# Patient Record
(demographics unavailable — no encounter records)

---

## 2024-11-12 NOTE — PHYSICAL EXAM
[Well Developed] : well developed [Well Nourished] : well nourished [No Acute Distress] : no acute distress [Normal Venous Pressure] : normal venous pressure [No Carotid Bruit] : no carotid bruit [Normal S1, S2] : normal S1, S2 [No Murmur] : no murmur [No Rub] : no rub [No Gallop] : no gallop [Clear Lung Fields] : clear lung fields [Good Air Entry] : good air entry [No Respiratory Distress] : no respiratory distress  [Soft] : abdomen soft [Non Tender] : non-tender [No Masses/organomegaly] : no masses/organomegaly [Normal Bowel Sounds] : normal bowel sounds [Normal Gait] : normal gait [No Edema] : no edema [No Cyanosis] : no cyanosis [No Clubbing] : no clubbing [No Varicosities] : no varicosities [No Rash] : no rash [No Skin Lesions] : no skin lesions [Moves all extremities] : moves all extremities [No Focal Deficits] : no focal deficits [Normal Speech] : normal speech [Alert and Oriented] : alert and oriented [Normal memory] : normal memory [General Appearance - Well Developed] : well developed [Normal Appearance] : normal appearance [Well Groomed] : well groomed [General Appearance - Well Nourished] : well nourished [No Deformities] : no deformities [General Appearance - In No Acute Distress] : no acute distress [Normal Conjunctiva] : the conjunctiva exhibited no abnormalities [Eyelids - No Xanthelasma] : the eyelids demonstrated no xanthelasmas [Normal Oral Mucosa] : normal oral mucosa [No Oral Pallor] : no oral pallor [No Oral Cyanosis] : no oral cyanosis [Normal Jugular Venous A Waves Present] : normal jugular venous A waves present [Normal Jugular Venous V Waves Present] : normal jugular venous V waves present [No Jugular Venous Rivas A Waves] : no jugular venous rivas A waves [Respiration, Rhythm And Depth] : normal respiratory rhythm and effort [Exaggerated Use Of Accessory Muscles For Inspiration] : no accessory muscle use [Auscultation Breath Sounds / Voice Sounds] : lungs were clear to auscultation bilaterally [Heart Rate And Rhythm] : heart rate and rhythm were normal [Heart Sounds] : normal S1 and S2 [Murmurs] : no murmurs present [Arterial Pulses Normal] : the arterial pulses were normal [Edema] : no peripheral edema present [Abdomen Soft] : soft [Abdomen Tenderness] : non-tender [Abdomen Mass (___ Cm)] : no abdominal mass palpated [Abnormal Walk] : normal gait [Gait - Sufficient For Exercise Testing] : the gait was sufficient for exercise testing [Nail Clubbing] : no clubbing of the fingernails [Cyanosis, Localized] : no localized cyanosis [Petechial Hemorrhages (___cm)] : no petechial hemorrhages [Skin Color & Pigmentation] : normal skin color and pigmentation [] : no rash [No Venous Stasis] : no venous stasis [Skin Lesions] : no skin lesions [No Skin Ulcers] : no skin ulcer [No Xanthoma] : no  xanthoma was observed [Oriented To Time, Place, And Person] : oriented to person, place, and time [Affect] : the affect was normal [Mood] : the mood was normal [No Anxiety] : not feeling anxious

## 2024-11-19 NOTE — CARDIOLOGY SUMMARY
[___] : [unfilled] [Normal] : normal [de-identified] : 11/12/24 SR 10/3/23 SR @ 73 bpm  2/7/23 NSR @ 73 bpm  6/15/21 SR @ 86 bpm 8/9/22 SR @ 73bpm  [de-identified] : 9/21/22 Normal LV size and function, mild symmetric LVH, normal atria, no significant valvular disease

## 2024-11-19 NOTE — DISCUSSION/SUMMARY
[FreeTextEntry1] : Mr. Burns is a 71 y/o. M with PMH significant for deviated septum, CVA without deficits, HTN and HLD who presents for a follow-up evaluation. He continues to feel well. He monitors his heart rhythm regularly with [employer] and has noted no arrhythmias. We discussed the utility of long-term monitoring (ILR, Apple watch), however he refuses at this time. He will follow up with the EP clinic in the future if a heart rhythm issue arises. A referral for general cardiology was provided.

## 2024-11-19 NOTE — REASON FOR VISIT
[Follow-Up - Clinic] : a clinic follow-up of [Dizziness] : dizziness [Hypertension] : hypertension [FreeTextEntry1] : Mr. Burns is a 69 y/o. M with PMH significant for deviated septum, CVA without deficits, HTN and HLD who presents for a follow-up evaluation.   This patient went to see a neurologist after experiencing some dizziness. 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 He was told to see a cardiologist. He initially presented for a cardiology visit. Pt has a PCP who monitors BP and cholesterol levels and prescribes these medications.  He was recommended to have an ILR for long term heart rhythm assessment but declined.  He also did not utilize LTM as recommended in 2021 but has been utilizing [employer].  He reports he is checking his rhythm daily and has had no AF readings.  No rapid/irregular heart action.  Patient reports normal exertional capacity and is active.   LTM given to patient for 14 days to assess for Afib.  (11/1/18-11/24/18) 48-. Brief 4 beat PAT,  No AT/AF.  Echo (10/29/18): Mild concentric LVH, EF 65%, LA size normal. No recent ech

## 2024-11-19 NOTE — DISCUSSION/SUMMARY
[FreeTextEntry1] : Mr. Burns is a 71 y/o. M with PMH significant for deviated septum, CVA without deficits, HTN and HLD who presents for a follow-up evaluation. He continues to feel well. He monitors his heart rhythm regularly with [employer] and has noted no arrhythmias. We discussed the utility of long-term monitoring (ILR, Apple watch), however he refuses at this time. He will follow up with the EP clinic in the future if a heart rhythm issue arises. A referral for general cardiology was provided.

## 2024-11-19 NOTE — REASON FOR VISIT
[Follow-Up - Clinic] : a clinic follow-up of [Dizziness] : dizziness [Hypertension] : hypertension [FreeTextEntry1] : Mr. Burns is a 69 y/o. M with PMH significant for deviated septum, CVA without deficits, HTN and HLD who presents for a follow-up evaluation.   This patient went to see a neurologist after experiencing some dizziness. 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 He was told to see a cardiologist. He initially presented for a cardiology visit. Pt has a PCP who monitors BP and cholesterol levels and prescribes these medications.  He was recommended to have an ILR for long term heart rhythm assessment but declined.  He also did not utilize LTM as recommended in 2021 but has been utilizing [employer].  He reports he is checking his rhythm daily and has had no AF readings.  No rapid/irregular heart action.  Patient reports normal exertional capacity and is active.   LTM given to patient for 14 days to assess for Afib.  (11/1/18-11/24/18) 48-. Brief 4 beat PAT,  No AT/AF.  Echo (10/29/18): Mild concentric LVH, EF 65%, LA size normal. No recent ech

## 2024-11-19 NOTE — END OF VISIT
[] : Fellow [FreeTextEntry3] :  I, Reese Cameron, personally performed the services described in the documentation, reviewed the documentation recorded by the scribe in my presence and it accurately and completely records my words and actions.

## 2024-11-19 NOTE — CARDIOLOGY SUMMARY
[___] : [unfilled] [Normal] : normal [de-identified] : 11/12/24 SR 10/3/23 SR @ 73 bpm  2/7/23 NSR @ 73 bpm  6/15/21 SR @ 86 bpm 8/9/22 SR @ 73bpm  [de-identified] : 9/21/22 Normal LV size and function, mild symmetric LVH, normal atria, no significant valvular disease

## 2024-12-06 NOTE — ASSESSMENT
[FreeTextEntry1] : Diarrhea: The patient complains of diarrhea.  I recommend a low residue diet. The patient is to avoid fiber supplementation. The patient is to consider starting a trial of a probiotic such as Align once a day.  If the symptoms persist, the patient may require sending stool studies for C+S, O+P x3, and C. difficile to assess for an infectious etiology of the diarrhea.  The symptoms are worse after meals.  The patient has a history of cholecystectomy.  I recommend a trial of cholestyramine one packet once a day for possible bile induced diarrhea. If the symptoms persist, the patient may require a colonoscopy to assess for colitis versus other causes.  The patient was told of the risks and benefits of the procedure.  The patient was told of the risks of perforation, emergency surgery, bleeding, infections and missed lesions.  The patient agreed and will follow-up to reassess the symptoms.   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  I recommend a repeat Amylase level.  If the amylase remains elevated, he may require an imaging study to assess the pancreas.  The patient was advised to avoid alcohol.   Diverticulosis: I recommend a high-fiber diet and avoid seeds. The patient is to consider a trial of Metamucil once a day for fiber supplementation. The patient is to also consider a trial of a probiotic such as Align once a day. Internal Hemorrhoids: The patient is to consider a trial of Anusol H. C. suppositories one per rectum nightly and Anusol HC2.5% cream apply to affected area twice a day p.r.n. hemorrhoidal bleeding or pain. History of Colonic Polyps: The patient has a history of colonic polyps. I recommend a repeat colonoscopy in 2 years to reassess for colonic polyps unless symptomatic. The patient agreed and will follow up for the procedure. Fatty Liver: The patient had an imaging study suggestive of fatty infiltration of the liver. The patient denies any jaundice or pruritus. The patient denies any alcohol use. The patient denies taking large doses of nonsteroidal anti-inflammatory drugs or acetaminophen. The findings are suggestive of fatty liver. The patient and I had a long discussion regarding the risks of fatty liver and possible progression to cirrhosis. The patient was told of the possible increased risk of developing liver failure, cirrhosis, ascites, GI bleeding secondary to varices, hepatic encephalopathy, bleeding tendencies and liver cancer. The patient was told of the importance of follow-up. The patient was advised to follow up every 6 months for blood work and imaging studies. The patient agreed and will follow up. The patient was advised to lose weight. I recommend a trial of vitamin E supplementation for the fatty liver. If the liver enzymes remain elevated, the patient may require a trial of Pioglitazone for the fatty liver. I recommend avoid alcohol and hepato-toxic agents. The patient was also advised to avoid NSAIDs, Acetaminophen and any other hepatotoxic drugs. The patient was also advised not to share needles, razors, scissors, nail clippers, etc.. The patient is to continue close follow-up in our office for blood work and exams. If the liver enzymes remain elevated, the patient may require a CT guided liver biopsy to assess the liver parenchyma for possible treatment. We had a long discussion regarding the risks and benefits of the procedure. The patient was told of the risks of bleeding, perforation, infections, emergency surgery and missing lesions. The patient agreed and will follow-up to reassess the symptoms. If the symptoms persist and the amylase remains elevated the patient may require a CAT scan of the abdomen and pelvis to assess the abnormalities. The patient was told of the risks and benefits of the test. The patient agreed and will follow-up to reassess the symptoms. Imaging Study: I recommend an imaging study to assess the symptoms. I recommend an abdominal ultrasound to assess the liver parenchyma and for liver lesions and pancreas. Blood Work: I recommend blood work to assess the patient's symptoms. I recommend a CBC, SMA 24, amylase, lipase, ESR, TFTs, iron, TIBC, ferritin level and lipid profile.  I also recommend obtaining the recent blood work performed by the patient's PMD. Follow-up: The patient is to follow-up in the office in 3 months to reassess the symptoms. The patient was told to call the office if any further problems.

## 2024-12-06 NOTE — HISTORY OF PRESENT ILLNESS
[FreeTextEntry1] : The colonoscopy to the cecum performed at the [facility] [place] GI endoscopy suite on August 17, 2021 revealed scattered left sided diverticulosis, a very long and tortuous colon and small internal hemorrhoids. There were no polyps, masses, AVMs or colitis noted. \par  The colonoscopy performed by another gastroenterologist, Dr. Carl Oro on May 25, 2017 revealed 5 ascending colon polyps and 1 transverse colon polyp.\par  \par   [de-identified] : The abdominal ultrasound performed on March 18, 2024 revealed hepatic steatosis.    The abdominal ultrasound performed on March 1, 2023 revealed hepatic steatosis with no focal hepatic lesions and status postcholecystectomy with normal caliber common bile duct of 5 mm.    The abdominal ultrasound performed on March 11, 2022 revealed hepatic steatosis with no focal hepatic lesions identified.

## 2024-12-06 NOTE — HISTORY OF PRESENT ILLNESS
[FreeTextEntry1] : The colonoscopy to the cecum performed at the [facility] [place] GI endoscopy suite on August 17, 2021 revealed scattered left sided diverticulosis, a very long and tortuous colon and small internal hemorrhoids. There were no polyps, masses, AVMs or colitis noted. \par  The colonoscopy performed by another gastroenterologist, Dr. Carl Oro on May 25, 2017 revealed 5 ascending colon polyps and 1 transverse colon polyp.\par  \par   [de-identified] : The abdominal ultrasound performed on March 18, 2024 revealed hepatic steatosis.    The abdominal ultrasound performed on March 1, 2023 revealed hepatic steatosis with no focal hepatic lesions and status postcholecystectomy with normal caliber common bile duct of 5 mm.    The abdominal ultrasound performed on March 11, 2022 revealed hepatic steatosis with no focal hepatic lesions identified.

## 2024-12-06 NOTE — ASSESSMENT
[FreeTextEntry1] : Diarrhea: The patient complains of diarrhea.  I recommend a low residue diet. The patient is to avoid fiber supplementation. The patient is to consider starting a trial of a probiotic such as Align once a day.  If the symptoms persist, the patient may require sending stool studies for C+S, O+P x3, and C. difficile to assess for an infectious etiology of the diarrhea.  The symptoms are worse after meals.  The patient has a history of cholecystectomy.  I recommend a trial of cholestyramine one packet once a day for possible bile induced diarrhea. If the symptoms persist, the patient may require a colonoscopy to assess for colitis versus other causes.  The patient was told of the risks and benefits of the procedure.  The patient was told of the risks of perforation, emergency surgery, bleeding, infections and missed lesions.  The patient agreed and will follow-up to reassess the symptoms.   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  I recommend a repeat Amylase level.  If the amylase remains elevated, he may require an imaging study to assess the pancreas.  The patient was advised to avoid alcohol.   Diverticulosis: I recommend a high-fiber diet and avoid seeds. The patient is to consider a trial of Metamucil once a day for fiber supplementation. The patient is to also consider a trial of a probiotic such as Align once a day. Internal Hemorrhoids: The patient is to consider a trial of Anusol H. C. suppositories one per rectum nightly and Anusol HC2.5% cream apply to affected area twice a day p.r.n. hemorrhoidal bleeding or pain. History of Colonic Polyps: The patient has a history of colonic polyps. I recommend a repeat colonoscopy in 2 years to reassess for colonic polyps unless symptomatic. The patient agreed and will follow up for the procedure. Fatty Liver: The patient had an imaging study suggestive of fatty infiltration of the liver. The patient denies any jaundice or pruritus. The patient denies any alcohol use. The patient denies taking large doses of nonsteroidal anti-inflammatory drugs or acetaminophen. The findings are suggestive of fatty liver. The patient and I had a long discussion regarding the risks of fatty liver and possible progression to cirrhosis. The patient was told of the possible increased risk of developing liver failure, cirrhosis, ascites, GI bleeding secondary to varices, hepatic encephalopathy, bleeding tendencies and liver cancer. The patient was told of the importance of follow-up. The patient was advised to follow up every 6 months for blood work and imaging studies. The patient agreed and will follow up. The patient was advised to lose weight. I recommend a trial of vitamin E supplementation for the fatty liver. If the liver enzymes remain elevated, the patient may require a trial of Pioglitazone for the fatty liver. I recommend avoid alcohol and hepato-toxic agents. The patient was also advised to avoid NSAIDs, Acetaminophen and any other hepatotoxic drugs. The patient was also advised not to share needles, razors, scissors, nail clippers, etc.. The patient is to continue close follow-up in our office for blood work and exams. If the liver enzymes remain elevated, the patient may require a CT guided liver biopsy to assess the liver parenchyma for possible treatment. We had a long discussion regarding the risks and benefits of the procedure. The patient was told of the risks of bleeding, perforation, infections, emergency surgery and missing lesions. The patient agreed and will follow-up to reassess the symptoms. If the symptoms persist and the amylase remains elevated the patient may require a CAT scan of the abdomen and pelvis to assess the abnormalities. The patient was told of the risks and benefits of the test. The patient agreed and will follow-up to reassess the symptoms. Imaging Study: I recommend an imaging study to assess the symptoms. I recommend an abdominal ultrasound to assess the liver parenchyma and for liver lesions and pancreas. Blood Work: I recommend blood work to assess the patient's symptoms. I recommend a CBC, SMA 24, amylase, lipase, ESR, TFTs, iron, TIBC, ferritin level and lipid profile.  I also recommend obtaining the recent blood work performed by the patient's PMD. Follow-up: The patient is to follow-up in the office in 3 months to reassess the symptoms. The patient was told to call the office if any further problems.  no

## 2025-03-07 NOTE — HISTORY OF PRESENT ILLNESS
[FreeTextEntry1] : The colonoscopy to the cecum performed at the [facility] [place] GI endoscopy suite on August 17, 2021 revealed scattered left sided diverticulosis, a very long and tortuous colon and small internal hemorrhoids. There were no polyps, masses, AVMs or colitis noted. \par  The colonoscopy performed by another gastroenterologist, Dr. Carl Oro on May 25, 2017 revealed 5 ascending colon polyps and 1 transverse colon polyp.\par  \par   [de-identified] : The abdominal ultrasound performed on December 9, 2024 revealed stable moderate fatty liver. Also noted was status postcholecystectomy.    The abdominal ultrasound performed on March 18, 2024 revealed hepatic steatosis.    The abdominal ultrasound performed on March 1, 2023 revealed hepatic steatosis with no focal hepatic lesions and status postcholecystectomy with normal caliber common bile duct of 5 mm.    The abdominal ultrasound performed on March 11, 2022 revealed hepatic steatosis with no focal hepatic lesions identified.

## 2025-03-07 NOTE — ASSESSMENT
[FreeTextEntry1] : Dyspepsia: The patient complains of dyspeptic symptoms.  The patient was advised to continue to abide by an anti-gas (low FOD-MAP) diet.  The patient was previously given a pamphlet for anti-gas (low FOD-MAP).  The patient and I reviewed the anti-gas (low FOD-MAP)diet at length again. The patient is to continue on a trial of Simethicone one tablet 4 times a day p.r.n. abdominal pain and gas. GERD: The patient was advised to avoid late-night meals and dietary indiscretions.  The patient was advised to avoid fried and fatty foods.  The patient was advised to abide by an anti-GERD diet. The patient was given a pamphlet for anti-GERD.  The patient and I reviewed the anti-GERD diet at length. I recommend a trial of famotidine 40 mg twice a day x 3 months for the symptoms. 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 I recommend a repeat Amylase level. If the amylase remains elevated, he may require an imaging study to assess the pancreas. The patient was advised to avoid alcohol.  The repeat amylase level was normal.  Diverticulosis: I recommend a high-fiber diet and avoid seeds. The patient is to consider a trial of Metamucil once a day for fiber supplementation. The patient is to also consider a trial of a probiotic such as Align once a day. Internal Hemorrhoids: The patient is to consider a trial of Anusol H. C. suppositories one per rectum nightly and Anusol HC2.5% cream apply to affected area twice a day p.r.n. hemorrhoidal bleeding or pain. History of Colonic Polyps: The patient has a history of colonic polyps. I recommend a repeat colonoscopy in 1 year to reassess for colonic polyps unless symptomatic. The patient agreed and will follow up for the procedure. Fatty Liver: The patient had an imaging study suggestive of fatty infiltration of the liver. The patient denies any jaundice or pruritus. The patient denies any alcohol use. The patient denies taking large doses of nonsteroidal anti-inflammatory drugs or acetaminophen. The findings are suggestive of fatty liver. The patient and I had a long discussion regarding the risks of fatty liver and possible progression to cirrhosis. The patient was told of the possible increased risk of developing liver failure, cirrhosis, ascites, GI bleeding secondary to varices, hepatic encephalopathy, bleeding tendencies and liver cancer. The patient was told of the importance of follow-up. The patient was advised to follow up every 6 months for blood work and imaging studies. The patient agreed and will follow up. The patient was advised to lose weight. I recommend a trial of vitamin E supplementation for the fatty liver. If the liver enzymes remain elevated, the patient may require a trial of Pioglitazone for the fatty liver. I recommend avoid alcohol and hepato-toxic agents. The patient was also advised to avoid NSAIDs, Acetaminophen and any other hepatotoxic drugs. The patient was also advised not to share needles, razors, scissors, nail clippers, etc.. The patient is to continue close follow-up in our office for blood work and exams. If the liver enzymes remain elevated, the patient may require a CT guided liver biopsy to assess the liver parenchyma for possible treatment. We had a long discussion regarding the risks and benefits of the procedure. The patient was told of the risks of bleeding, perforation, infections, emergency surgery and missing lesions. The patient agreed and will follow-up to reassess the symptoms. If the symptoms recur and the amylase becomes elevated again, the patient may require a CAT scan of the abdomen and pelvis to assess the abnormalities. The patient was told of the risks and benefits of the test. The patient agreed and will follow-up to reassess the symptoms. Follow-up: The patient is to follow-up in the office in 6 months to reassess the symptoms. The patient was told to call the office if any further problems.